# Patient Record
Sex: FEMALE | Race: WHITE | URBAN - METROPOLITAN AREA
[De-identification: names, ages, dates, MRNs, and addresses within clinical notes are randomized per-mention and may not be internally consistent; named-entity substitution may affect disease eponyms.]

---
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
## 2025-06-07 ENCOUNTER — OFFICE VISIT (OUTPATIENT)
Dept: URGENT CARE | Facility: URGENT CARE | Age: 20
End: 2025-06-07
Payer: COMMERCIAL

## 2025-06-07 VITALS
HEART RATE: 89 BPM | BODY MASS INDEX: 19.15 KG/M2 | TEMPERATURE: 99.3 F | DIASTOLIC BLOOD PRESSURE: 77 MMHG | RESPIRATION RATE: 17 BRPM | SYSTOLIC BLOOD PRESSURE: 120 MMHG | HEIGHT: 67 IN | WEIGHT: 122 LBS | OXYGEN SATURATION: 99 %

## 2025-06-07 DIAGNOSIS — R11.2 NAUSEA AND VOMITING, UNSPECIFIED VOMITING TYPE: Primary | ICD-10-CM

## 2025-06-07 PROBLEM — F12.90 MARIJUANA USER: Status: ACTIVE | Noted: 2025-06-07

## 2025-06-07 PROCEDURE — 3078F DIAST BP <80 MM HG: CPT | Performed by: FAMILY MEDICINE

## 2025-06-07 PROCEDURE — 3074F SYST BP LT 130 MM HG: CPT | Performed by: FAMILY MEDICINE

## 2025-06-07 PROCEDURE — 99203 OFFICE O/P NEW LOW 30 MIN: CPT | Performed by: FAMILY MEDICINE

## 2025-06-07 RX ORDER — ONDANSETRON 4 MG/1
4 TABLET, ORALLY DISINTEGRATING ORAL EVERY 8 HOURS PRN
Qty: 20 TABLET | Refills: 0 | Status: SHIPPED | OUTPATIENT
Start: 2025-06-07

## 2025-06-07 ASSESSMENT — ENCOUNTER SYMPTOMS
VOMITING: 1
NAUSEA: 1

## 2025-06-07 NOTE — LETTER
June 11, 2025      Charlene Soliz  [address] [facility] [address]        To Whom It May Concern:    Charlene Soliz  was seen on 6/9.  Please excuse her until she improves.        Sincerely,        Dominga Hoffmann MD    Electronically signed

## 2025-06-07 NOTE — LETTER
2025    Charlenewilian Enriquezfrancisco   2005        To Whom it May Concern;    Please excuse Charlene Heydi from work until 24 due to illness.    Sincerely,        Happy ELSA Hoffmann MD

## 2025-06-07 NOTE — PROGRESS NOTES
Urgent Care Clinic Visit    Chief Complaint   Patient presents with    Urgent Care    Nausea & Vomiting     Pt reports nausea, vomiting and abdominal pain onset Thursday, vomiting every 15 minutes can't keep anything down.  Concerned about stomach flu. Hx of abdominal pain   Felt feverish, dehydrated and weak yesterday but did not take a temp.   Denies diarrhea                6/7/2025     9:47 AM   Additional Questions   Roomed by Amie   Accompanied by self

## 2025-06-07 NOTE — PROGRESS NOTES
"Rooming Notes:    Patient presents with:  Urgent Care  Nausea & Vomiting: Pt reports nausea, vomiting and abdominal pain onset Thursday, vomiting every 15 minutes can't keep anything down.  Concerned about stomach flu. Hx of abdominal pain   Felt feverish, dehydrated and weak yesterday but did not take a temp.   Denies diarrhea         Physician Note:    Assessment/MDM:    Charlene Soliz is a 19 year old female is here today for possible gastroenteritis versus may have cyclic vomiting syndrome.  Note for work follow-up as needed.       HPI:  Does use marijuana daily but think she has more of a stomach bug.  Uncontrollable nausea and vomiting every 15 minutes or so.  Would like a note for work         Review of Systems   Gastrointestinal:  Positive for nausea and vomiting.   All other systems reviewed and are negative.      Vitals:    06/07/25 0943 06/07/25 0946   BP: (!) 130/90 120/77   Pulse: 73 89   Resp: 17 17   Temp: 99.3  F (37.4  C)    TempSrc: Tympanic    SpO2: 96% 99%   Weight: 55.3 kg (122 lb)    Height: 1.702 m (5' 7\")        Physical Exam  Vitals and nursing note reviewed.         Results:  No results found for any visits on 06/07/25.        Past Medical History: has been reviewed by me. I have also reviewed past visits, lab results and studies  Adverse Drug Reactions: Patient has no known allergies.    Medications: reviewed by me today    Family History: Reviewed by me today  Social History:   Social History     Tobacco Use    Smoking status: Every Day     Types: Other    Smokeless tobacco: Never    Tobacco comments:     Marijuana use    Substance Use Topics    Alcohol use: Not Currently       Tobacco:   History   Smoking Status    Every Day    Types: Other   Smokeless Tobacco    Never         I have reviewed and recommended any over-the-counter medications that will aid in the symptomatic relief of this illness.    The risk of complications, morbidity, and/or mortality of patient management decisions " were made during the visit with the patient. These may be associated with the patient s problems, the diagnostic procedures, or the treatment. This includes possible management options selected, as well options considered but ultimately not selected, after shared medical decision making with the patient and/or family.        ICD-10-CM    1. Nausea and vomiting, unspecified vomiting type  R11.2 ondansetron (ZOFRAN ODT) 4 MG ODT venecia Hoffmann MD  6/7/2025, 1:02 PM.      There are no Patient Instructions on file for this visit.

## 2025-06-11 ENCOUNTER — VIRTUAL VISIT (OUTPATIENT)
Dept: FAMILY MEDICINE | Facility: CLINIC | Age: 20
End: 2025-06-11
Payer: COMMERCIAL

## 2025-06-11 DIAGNOSIS — Z87.898 H/O NAUSEA AND VOMITING: ICD-10-CM

## 2025-06-11 DIAGNOSIS — Z86.19 H/O VIRAL ILLNESS: ICD-10-CM

## 2025-06-11 DIAGNOSIS — Z76.89 RETURN TO WORK EVALUATION: Primary | ICD-10-CM

## 2025-06-11 DIAGNOSIS — Z87.19 H/O GASTROENTERITIS: ICD-10-CM

## 2025-06-11 PROCEDURE — 98001 SYNCH AUDIO-VIDEO NEW LOW 30: CPT | Performed by: INTERNAL MEDICINE

## 2025-06-11 NOTE — PATIENT INSTRUCTIONS
Based on our discussion, I have outlined the following instructions for you:      - You can go back to work on June 13, 2025.  - You can  a letter that says you are okay to return to work at the .  - Make sure to drink plenty of water, especially when you are working outside.  - Take breaks in the shade and drink extra fluids to stay hydrated.    Thank you again for your visit, and we look forward to supporting you in your journey to better health.

## 2025-06-11 NOTE — PROGRESS NOTES
Charlene is a 19 year old who is being evaluated via a billable video visit.    How would you like to obtain your AVS? MyChart  If the video visit is dropped, the invitation should be resent by: Text to cell phone: [phone]  Will anyone else be joining your video visit? No      Assessment & Plan   Problem List Items Addressed This Visit    None  Visit Diagnoses         Return to work evaluation    -  Primary      H/O viral illness          H/O gastroenteritis          H/O nausea and vomiting               Assessment & Plan  Return to work evaluation:  - Cleared to return to work after virtual visit. No current symptoms preventing work.  - Provide a letter for work clearance, available at the  in [place].     Assessment & Plan  Recent stomach virus:  Recent stomach virus with symptoms of vomiting, likely a stomach bug as per urgent care visit. Symptoms have resolved since June 7, 2025, with no current nausea or vomiting.  Cleared to return to work on June 13, 2025. A letter confirming clearance to return to work will be available at the .    Dehydration risk:  Risk of dehydration due to recent illness and outdoor work environment.  Advise to stay well-hydrated, especially when working outdoors. Recommended to take breaks in the shade and consume extra fluids.      Nicotine/Tobacco Cessation  She reports that she has been smoking other. She has never used smokeless tobacco.  Nicotine/Tobacco Cessation Plan            Subjective   Charlene is a 19 year old, presenting for the following health issues:  Forms        6/7/2025     9:47 AM   Additional Questions   Roomed by Amie   Accompanied by self     HPI Charlene Heydi, 19 years    Gastroenteritis  - Experienced symptoms of a stomach virus or stomach flu, including significant vomiting  - Symptoms began on Thursday, June 5, 2025, around 1 AM  - Last episode of vomiting occurred on Saturday, June 7, 2025  - No diarrhea or blood in stools reported  -  Slight fever was present but not further investigated  - No urine problems or burning sensation during urination  - No risk of pregnancy  - No dizziness or lightheadedness reported  - Slight residual stomach discomfort, but no significant belly pain  - No nausea or need for anti-nausea medication on the day of the encounter  - Able to eat and regain strength  - Hydrating well with water and Gatorade    Social and Lifestyle Factors  - Works as a  at a hotel, primarily outdoors  - Consumes alcohol socially  - Uses cannabis regularly without usual problems, but aware of potential cannabis-induced vomiting syndrome  - Plans to return to work on Friday, June 13, 2025    History of Present Illness-  Charlene Soliz, 19 years    Stomach Virus  Symptoms began on Thursday, June 5, 2025, around 1 AM, with significant vomiting. Visited urgent care on Saturday, June 7, 2025, and was seen by Dr. Dominga Hoffmann, who prescribed anti-nausea medication. No blood work was performed at urgent care. Symptoms have resolved since Sunday, June 8, 2025, with no vomiting, diarrhea, or blood in stools. Slight fever was present initially but has since abated. No dizziness or lightheadedness reported. Hydration maintained with water and Gatorade. No current nausea or abdominal pain, and no issues with urine or risk of pregnancy. No cough, runny nose, fever, chills, or headaches. Social alcohol use reported, and reports cannabis use does not typically cause nausea.     Review of Systems  Constitutional, HEENT, cardiovascular, pulmonary, gi and gu systems are negative, except as otherwise noted.      Objective           Vitals:  No vitals were obtained today due to virtual visit.    Physical Exam   GENERAL: alert and no distress  EYES: Eyes grossly normal to inspection.  No discharge or erythema, or obvious scleral/conjunctival abnormalities.  RESP: No audible wheeze, cough, or visible cyanosis.    SKIN: Visible skin clear. No  significant rash, abnormal pigmentation or lesions.  NEURO: Cranial nerves grossly intact.  Mentation and speech appropriate for age.  PSYCH: Appropriate affect, tone, and pace of words      Physical Exam  CARDIOVASCULAR: Blood pressure 120/70 mmHg, heart rate 89 bpm.  LUNGS: Oxygen saturation 99%.     ResultsVital signs at urgent care: blood pressure 120/70 mmHg, heart rate 89 bpm, oxygen saturation 99%. No blood work was performed at urgent care.     No results found for any previous visit.         Video-Visit Details    Type of service:  Video Visit   Originating Location (pt. Location): Home    Distant Location (provider location):  On-site  Platform used for Video Visit: Johnathan  Signed Electronically by: Evangelista Neumann MD